# Patient Record
Sex: FEMALE | Race: ASIAN | Employment: OTHER | URBAN - METROPOLITAN AREA
[De-identification: names, ages, dates, MRNs, and addresses within clinical notes are randomized per-mention and may not be internally consistent; named-entity substitution may affect disease eponyms.]

---

## 2017-03-20 ENCOUNTER — HOSPITAL ENCOUNTER (OUTPATIENT)
Dept: MAMMOGRAPHY | Age: 67
Discharge: HOME | End: 2017-03-20
Attending: FAMILY MEDICINE
Payer: MEDICARE

## 2017-03-20 DIAGNOSIS — Z12.31 ENCOUNTER FOR SCREENING MAMMOGRAM FOR BREAST CANCER: ICD-10-CM

## 2017-03-20 DIAGNOSIS — Z01.419 WELL WOMAN EXAM: ICD-10-CM

## 2017-03-20 PROCEDURE — 77067 SCR MAMMO BI INCL CAD: CPT

## 2018-01-11 ENCOUNTER — HOSPITAL ENCOUNTER (OUTPATIENT)
Dept: GENERAL RADIOLOGY | Age: 68
Discharge: HOME | End: 2018-01-11
Attending: FAMILY MEDICINE
Payer: MEDICARE

## 2018-01-11 DIAGNOSIS — M54.41 RIGHT-SIDED LOW BACK PAIN WITH RIGHT-SIDED SCIATICA, UNSPECIFIED CHRONICITY: ICD-10-CM

## 2018-01-11 PROCEDURE — 72100 X-RAY EXAM L-S SPINE 2/3 VWS: CPT

## 2018-03-21 ENCOUNTER — HOSPITAL ENCOUNTER (OUTPATIENT)
Dept: MAMMOGRAPHY | Age: 68
Discharge: HOME | End: 2018-03-21
Attending: FAMILY MEDICINE
Payer: MEDICARE

## 2018-03-21 DIAGNOSIS — Z12.31 SCREENING MAMMOGRAM, ENCOUNTER FOR: ICD-10-CM

## 2018-03-21 PROCEDURE — 77067 SCR MAMMO BI INCL CAD: CPT

## 2018-11-19 ENCOUNTER — HOSPITAL ENCOUNTER (OUTPATIENT)
Dept: MAMMOGRAPHY | Age: 68
Discharge: HOME | End: 2018-11-19
Attending: FAMILY MEDICINE
Payer: MEDICARE

## 2018-11-19 DIAGNOSIS — M85.80 OSTEOPENIA, UNSPECIFIED LOCATION: ICD-10-CM

## 2018-11-19 DIAGNOSIS — M81.0 AGE-RELATED OSTEOPOROSIS WITHOUT CURRENT PATHOLOGICAL FRACTURE: ICD-10-CM

## 2018-11-19 PROCEDURE — 77080 DXA BONE DENSITY AXIAL: CPT

## 2019-04-08 ENCOUNTER — HOSPITAL ENCOUNTER (OUTPATIENT)
Dept: MAMMOGRAPHY | Age: 69
Discharge: HOME | End: 2019-04-08
Attending: FAMILY MEDICINE
Payer: MEDICARE

## 2019-04-08 DIAGNOSIS — Z12.31 VISIT FOR SCREENING MAMMOGRAM: ICD-10-CM

## 2019-04-08 PROCEDURE — 77067 SCR MAMMO BI INCL CAD: CPT

## 2019-06-21 ENCOUNTER — HOSPITAL ENCOUNTER (OUTPATIENT)
Dept: GENERAL RADIOLOGY | Age: 69
Discharge: HOME | End: 2019-06-21
Attending: FAMILY MEDICINE
Payer: MEDICARE

## 2019-06-21 DIAGNOSIS — M54.2 NECK PAIN: ICD-10-CM

## 2019-06-21 PROCEDURE — 72052 X-RAY EXAM NECK SPINE 6/>VWS: CPT

## 2019-06-24 ENCOUNTER — HOSPITAL ENCOUNTER (OUTPATIENT)
Dept: PHYSICAL THERAPY | Age: 69
Discharge: HOME | End: 2019-06-24
Attending: FAMILY MEDICINE
Payer: MEDICARE

## 2019-06-24 DIAGNOSIS — M54.2 NECK PAIN: ICD-10-CM

## 2019-06-24 PROCEDURE — 97162 PT EVAL MOD COMPLEX 30 MIN: CPT

## 2019-06-24 PROCEDURE — 97110 THERAPEUTIC EXERCISES: CPT

## 2019-06-24 PROCEDURE — 97140 MANUAL THERAPY 1/> REGIONS: CPT

## 2019-06-24 NOTE — THERAPY EVALUATION
Riley Clancy  : 1950  [place]  Secondary: [place] [id number] [place] [address], [facility] [address].  [phone]   [phone]           OUTPATIENT PHYSICAL THERAPY:Initial Assessment 2019   ICD-10: Treatment Diagnosis: Cervicalgia (M54.2)  Precautions/Allergies:   Penicillins   TREATMENT PLAN:  Effective Dates: 2019 TO 2019 (90 days). Frequency/Duration: 2 times a week for 90 Day(s) MEDICAL/REFERRING DIAGNOSIS:  Neck pain [M54.2]   DATE OF ONSET: 2019  REFERRING PHYSICIAN: Sonja Ramires MD MD Orders: Evaluate and Treat  Return MD Appointment: 19     INITIAL ASSESSMENT:  Ms. Marquis Interiano presents with decreased cervical ROM, bilateral shoulder strength L>R and increased pain leading to decreased functional status. Pt would benefit from skilled physical therapy services to address the above deficits and help patient return to prior level of function. PROBLEM LIST (Impacting functional limitations):  1. Decreased Strength  2. Decreased ADL/Functional Activities  3. Increased Pain  4. Decreased Flexibility/Joint Mobility  5. Decreased [place] with Home Exercise Program INTERVENTIONS PLANNED: (Treatment may consist of any combination of the following)  1. Cold  2. Cryotherapy  3. Electrical Stimulation  4. Heat  5. Home Exercise Program (HEP)  6. Manual Therapy  7. Range of Motion (ROM)  8. Therapeutic Exercise/Strengthening  9. Ultrasound (US)     GOALS: (Goals have been discussed and agreed upon with patient.)  Short-Term Functional Goals: Time Frame: 4 weeks  1. Pt will increase cervical ROM flexion = 40 degrees, side bending = 25 degrees bilaterally, rotation L = 60 degrees to assist with driving  2. Pt will increase strength L shoulder 4+/5 to assist with driving  3. Pt will be independent with HEP  Discharge Goals: Time Frame: 12 weeks  1.  Pt will increase strength bilateral shoulders 5/5 to assist with driving  2. Pt will improve Neck Disability Index Score to \"0\" to show overall improvement in function  3. Pt will drive 20 minutes with no c/o neck pain    OUTCOME MEASURE:   Tool Used: Neck Disability Index (NDI)  Score:  Initial: 5/50  Most Recent: X/50 (Date: -- )   Interpretation of Score: The Neck Disability Index is a revised form of the Oswestry Low Back Pain Index and is designed to measure the activities of daily living in person's with neck pain. Each section is scored on a 0-5 scale, 5 representing the greatest disability. The scores of each section are added together for a total score of 50. MEDICAL NECESSITY:   · Patient is expected to demonstrate progress in strength, range of motion and functional technique to increase independence with driving. · Patient demonstrates good rehab potential due to higher previous functional level. REASON FOR SERVICES/OTHER COMMENTS:  · Patient continues to require skilled intervention due to decreased cervical ROM, decreased bilateral shoulder strength and increased pain leading to decreased functional status. Total Duration:       Rehabilitation Potential For Stated Goals: Good  Regarding Shaila CLINT Lowe's therapy, I certify that the treatment plan above will be carried out by a therapist or under their direction. Thank you for this referral,  Agapito De La Garza PT     Referring Physician Signature: Natividad Hoover MD _______________________________ Date _____________     PAIN/SUBJECTIVE:   Initial:   3/10 Post Session:  2/10   HISTORY:   History of Injury/Illness (Reason for Referral):  Pt reports insidious onset L neck and UT pain of approximately 2 months duration. She states she had a shingles vaccine 2 months ago in her L shoulder and feels this may have contributed to her pain. She states she also started Fosamax about 2 months ago which may also have led to her symptoms.   She had x-rays which revealed degenerative disc disease at C4-C5, C5-C6 and C6-C7. Pt is taking Advil prn for her neck pain. She rates her current pain 3/10, increasing to 5/10 at times. She denies numbness/tingling into her L UE. She is retired. Pt is currently having difficulties turning her head to drive due to her neck pain. Past Medical History/Comorbidities:   Ms. Lian Scruggs  has a past medical history of Hypertension and Menopause. Ms. Lian Scruggs  has a past surgical history that includes hx colonoscopy (2016) and hx gyn. Social History/Living Environment:     Pt lives with  who assists with ADL's as needed  Prior Level of Function/Work/Activity:  Pt is retired  Dominant Side:         RIGHT  Other Clinical Tests:          X-rays cervical spine (see above)  Personal Factors:          Sex:  female        Age:  70 y.o. Profession:  Pt is retired   Ambulatory/Rehab [address] [place] [address] Risk Assessment   Risk Factors:       No Risk Factors Identified Ability to Rise from Chair:       (0)  Ability to rise in a single movement   Falls Prevention Plan:       No modifications necessary   Total: (5 or greater = High Risk): 0   ©2010 [facility] [address] [place] [address] States Patent #[id number]. Federal Law prohibits the replication, distribution or use without written permission from [facility] [employer]   Current Medications:       Current Outpatient Medications:     amLODIPine (NORVASC) 5 mg tablet, TAKE ONE TABLET BY MOUTH ONE TIME DAILY, Disp: 90 Tab, Rfl: 3    alendronate (FOSAMAX) 70 mg tablet, Take 1 Tab by mouth every seven (7) days. , Disp: 13 Tab, Rfl: 3    Blood Pressure Monitor kit, Check blood pressure twice daily, Disp: 1 Kit, Rfl: 0    GLUCOSAMINE-CONDROITIN-HRB#182 PO, Take  by mouth., Disp: , Rfl:     FISH  mg cap, Take  by mouth., Disp: , Rfl:     calcium-cholecalciferol, d3, (CALCIUM 600 + D) 600-125 mg-unit tab, Take  by mouth., Disp: , Rfl:     lutein 20 mg cap, Take  by mouth., Disp: , Rfl: Date Last Reviewed:  06-24-19   Number of Personal Factors/Comorbidities that affect the Plan of Care: 1-2: MODERATE COMPLEXITY   EXAMINATION:   Observation/Orthostatic Postural Assessment: Forward head, rounded shoulders  Palpation:          Tenderness L Upper Trap and Levator Scap  ROM:    Cervical Flexion = 31 degrees  Cervical Extension = 19 degrees  Cervical Side Bending R = 20 degrees  Cervical Side Bending L = 17 degrees  Cervical Rotation R = 61 degrees  Cervical Rotation L = 51 degrees     Strength:    R shoulder flexion =  4+/5  R shoulder abduction = 4+/5  R elbow flexion = 5/5  R elbow extension = 5/5  R wrist flexion = 5/5  R wrist extension = 5/5    L shoulder flexion = 4/5  L shoulder abduction = 4/5  L elbow flexion = 5/5  L elbow extension = 5/5  L wrist flexion = 5/5  L wrist extension = 5/5    Special Tests:          Cervical compression negative, cervical distraction reduces symptoms  Neurological Screen:        Myotomes:  Mild weakness Left C5        Dermatomes:  Sensation intact to light touch bilateral UE's        Reflexes:  DTR's 2+ to bilateral brachioradialis, biceps and triceps  Functional Mobility:         Transfers:  Pt able to transition sit to supine and supine to sit independently    Body Structures Involved:  1. Bones  2. Joints  3. Muscles Body Functions Affected:  1. Sensory/Pain  2. Neuromusculoskeletal Activities and Participation Affected:  1. Mobility  2.  Domestic Life   Number of elements (examined above) that affect the Plan of Care: 3: MODERATE COMPLEXITY   CLINICAL PRESENTATION:   Presentation: Evolving clinical presentation with changing clinical characteristics: MODERATE COMPLEXITY   CLINICAL DECISION MAKING:   Use of outcome tool(s) and clinical judgement create a POC that gives a: Questionable prediction of patient's progress: MODERATE COMPLEXITY

## 2019-06-24 NOTE — PROGRESS NOTES
Leyda Patel  : 1950  [place]  Secondary: [address]  [address], [address] [facility] 83[address] 0[address], 4[address] [facility]  [phone]   [phone]         OUTPATIENT PHYSICAL THERAPY: Daily Treatment Note 2019  Visit Count: 1    ICD-10: Treatment Diagnosis: Cervicalgia (M54.2)  Precautions/Allergies:   Penicillins   TREATMENT PLAN:  Effective Dates: 2019 TO 2019 (90 days). Frequency/Duration: 2 times a week for 90 Day(s)    Pre-treatment Symptoms/Complaints:  L neck pain  Pain: Initial:   3/10 Post Session:  2/10   Medications Last Reviewed:  2019  Updated Objective Findings:  See evaluation note from today  TREATMENT:     THERAPEUTIC EXERCISE: (10 minutes):  Exercises per grid below to improve mobility and strength. Required minimal verbal cues to promote proper body alignment and promote proper body posture. Progressed resistance and repetitions as indicated. MANUAL THERAPY: (15 minutes): Soft tissue mob to bilateral cervical paraspinals, UT and levator scap L>R was utilized and necessary because of the patient's restricted motion of soft tissue. Date:  19 Date:   Date:     Activity/Exercise Parameters Parameters Parameters   Chin Tucks in Supine 10 reps  5 sec holds     T-band Rows Red t-band  20 reps     L Upper Trap Stretch 3 reps  20 sec holds                                 MedBridge Portal  Treatment/Session Summary:    · Response to Treatment:  Pt tolerated all treatments well today with no c/o. She reported decreased pain following treatments today. .  · Communication/Consultation:  None today  · Equipment provided today:  None today  · Recommendations/Intent for next treatment session: Next visit will focus on manual therapy, progress therex as tolerable.     Total Treatment Billable Duration:  25 minutes  PT Patient Time In/Time Out  Time In: 0900  Time Out: 945  Shefali Gibson Marieta Dakins, PT    Future Appointments   Date Time Provider Nubia Whitmore   6/26/2019  2:30 PM Ivis Bass, PTA [facility] SFE   7/2/2019 10:15 AM Fabrice Luevanok, PT [facility] SFE   7/5/2019  3:15 PM Burkassie Muck, PT [facility] SFE   7/8/2019  3:15 PM Ivis Bass, PTA [facility] SFE   7/10/2019  3:15 PM [facility], Cely Danes, PTA [facility] SFE   7/12/2019 11:15 AM Elva Irvin MD SSA [facility] RF   7/16/2019  3:15 PM Ivis Bass, PTA [facility] SFE   7/18/2019  3:15 PM Fabrice Barron, PT [facility] SFE   12/5/2019 10:15 AM Augusto Jordan, Ivana Morales [facility]

## 2019-06-26 ENCOUNTER — HOSPITAL ENCOUNTER (OUTPATIENT)
Dept: PHYSICAL THERAPY | Age: 69
Discharge: HOME | End: 2019-06-26
Attending: FAMILY MEDICINE
Payer: MEDICARE

## 2019-06-26 PROCEDURE — 97110 THERAPEUTIC EXERCISES: CPT

## 2019-06-26 PROCEDURE — 97140 MANUAL THERAPY 1/> REGIONS: CPT

## 2019-06-26 NOTE — PROGRESS NOTES
Jose Matthew  : 1950  [place]  Secondary: [place] [id number] [facility], [address] [place] [address] [address], [facility] [address]  [phone]   [phone]         OUTPATIENT PHYSICAL THERAPY: Daily Treatment Note 2019  Visit Count: 1    ICD-10: Treatment Diagnosis: Cervicalgia (M54.2)  Precautions/Allergies:   Penicillins   TREATMENT PLAN:  Effective Dates: 2019 TO 2019 (90 days). Frequency/Duration: 2 times a week for 90 Day(s)    Pre-treatment Symptoms/Complaints:  L neck pain \"it is feeling a little better\". Pain: Initial:   3/10 Post Session:  2/10   Medications Last Reviewed:  2019  Updated Objective Findings:  None Today  TREATMENT:     THERAPEUTIC EXERCISE: (20 minutes):  Exercises per grid below to improve mobility and strength. Required minimal verbal cues to promote proper body alignment and promote proper body posture. Progressed resistance and repetitions as indicated. MANUAL THERAPY: (25 minutes): Soft tissue to Left Upper Trap and cervical region secondary to increased tightness and where the pain originates. was utilized and necessary because of the patient's restricted joint motion, loss of articular motion and restricted motion of soft tissue. Date:  19 Date:   Date:     Activity/Exercise Parameters Parameters Parameters   Chin Tucks in Supine 10 reps  5 sec holds     T-band Rows Red t-band  20 reps     L Upper Trap Stretch 3 reps  20 sec holds     UBE X 5 min Level 3.0     Left Upper Levator Stretch 3 x 20 sec holds                     MedBridge Portal  Treatment/Session Summary:    · Response to Treatment:  Pt tolerated all treatments well today with no c/o. She reported decreased pain following treatments today. .  · Communication/Consultation:  None today  · Equipment provided today:  None today  · Recommendations/Intent for next treatment session: Next visit will focus on manual therapy, progress therex as tolerable.     Total Treatment Billable Duration: 45 minutes  PT Patient Time In/Time Out  Time In: 1430  Time Out: Vijay Cheng, [place]    Future Appointments   Date Time Provider Nubia Myranda   6/26/2019  2:30 PM Yonatan Adames, PTA [facility] SFE   7/2/2019 10:15 AM Josh Woods, PT [facility] SFE   7/5/2019  3:15 PM Josh Woods, PT [facility] SFE   7/8/2019  3:15 PM Yonatan Adames, PTA [facility] SFE   7/10/2019  3:15 PM [facility], [facility], PTA [facility] SFE   7/12/2019 11:15 AM MD CRISTIANO Cervantes RFM RFM   7/16/2019  3:15 PM Yonatan Adames, PTA [facility] SFE   7/18/2019  3:15 PM Josh Woods, PT [facility] SFE   12/5/2019 10:15 AM Laura Alfaro MD SSA [facility] RF

## 2019-07-02 ENCOUNTER — HOSPITAL ENCOUNTER (OUTPATIENT)
Dept: PHYSICAL THERAPY | Age: 69
Discharge: HOME | End: 2019-07-02
Attending: FAMILY MEDICINE
Payer: MEDICARE

## 2019-07-02 PROCEDURE — 97110 THERAPEUTIC EXERCISES: CPT

## 2019-07-02 PROCEDURE — 97140 MANUAL THERAPY 1/> REGIONS: CPT

## 2019-07-02 NOTE — PROGRESS NOTES
Payton Saavedra  : 1950  [place]  Secondary: [address] [facility]  [address] [facility] [address], [address], [place] [address]  [phone]   [phone]         OUTPATIENT PHYSICAL THERAPY: Daily Treatment Note 2019  Visit Count: 1    ICD-10: Treatment Diagnosis: Cervicalgia (M54.2)  Precautions/Allergies:   Penicillins   TREATMENT PLAN:  Effective Dates: 2019 TO 2019 (90 days). Frequency/Duration: 2 times a week for 90 Day(s)    Pre-treatment Symptoms/Complaints:  L neck pain; Pt states her neck is much better. Pain: Initial:   2/10 Post Session:  0/10   Medications Last Reviewed:  2019  Updated Objective Findings:  None Today  TREATMENT:     THERAPEUTIC EXERCISE: (20 minutes):  Exercises per grid below to improve mobility and strength. Required minimal verbal cues to promote proper body alignment and promote proper body posture. Progressed resistance and repetitions as indicated. MANUAL THERAPY: (25 minutes): Soft tissue to Left Upper Trap and cervical region secondary to increased tightness and where the pain originates. was utilized and necessary because of the patient's restricted joint motion, loss of articular motion and restricted motion of soft tissue. Date:  19 Date:  19 Date:     Activity/Exercise Parameters Parameters Parameters   Chin Tucks in Supine 10 reps  5 sec holds 10 reps  5 sec holds    T-band Rows Red t-band  20 reps Green T-band  20 reps    L Upper Trap Stretch 3 reps  20 sec holds 3 reps  20 sec holds    UBE X 5 min Level 3.0 Level 3   6 minutes    Left Upper Levator Stretch 3 x 20 sec holds     T-band Straight Arm Pulldowns  Green T-band  20 reps              MedBridge Portal  Treatment/Session Summary:    · Response to Treatment:  Pt tolerated all treatments well today with no c/o. She denied pain following treatments today. .  · Communication/Consultation:  None today  · Equipment provided today:  None today  · Recommendations/Intent for next treatment session: Next visit will focus on manual therapy, progress therex as tolerable.     Total Treatment Billable Duration: 45 minutes  PT Patient Time In/Time Out  Time In: 1015  Time Out: 200 [facility], PT    Future Appointments   Date Time Provider Nubia Whitmore   7/5/2019  3:15 PM Delphine Ponce, PT [facility]   7/8/2019  3:15 PM Shayy Richard PTA [facility] E   7/10/2019  3:15 PM Shayy Richard PTA [facility] E   7/12/2019 11:15 AM MD CRISTIANO Lovelace RFM RFM   7/16/2019  3:15 PM Shayy Richard PTA [facility] E   7/18/2019  3:15 [facility]   12/5/2019 10:15 AM Vanessa Pappas MD SSA RFM RF

## 2019-07-05 ENCOUNTER — HOSPITAL ENCOUNTER (OUTPATIENT)
Dept: PHYSICAL THERAPY | Age: 69
Discharge: HOME | End: 2019-07-05
Attending: FAMILY MEDICINE
Payer: MEDICARE

## 2019-07-05 PROCEDURE — 97110 THERAPEUTIC EXERCISES: CPT

## 2019-07-05 PROCEDURE — 97140 MANUAL THERAPY 1/> REGIONS: CPT

## 2019-07-05 NOTE — PROGRESS NOTES
Sherine Winston  : 1950  [place]  Secondary: [address]  27[address] [facility], [address] [place] [address] [address], 9[address] [facility]  [phone]   [phone]         OUTPATIENT PHYSICAL THERAPY: Daily Treatment Note 2019  Visit Count: 1    ICD-10: Treatment Diagnosis: Cervicalgia (M54.2)  Precautions/Allergies:   Penicillins   TREATMENT PLAN:  Effective Dates: 2019 TO 2019 (90 days). Frequency/Duration: 2 times a week for 90 Day(s)    Pre-treatment Symptoms/Complaints:  L neck pain; Pt states she has a little discomfort in her L UT area, but she states her scapular pain is gone now. Pain: Initial:   2/10 Post Session:  0/10   Medications Last Reviewed:  2019  Updated Objective Findings:  None Today  TREATMENT:     THERAPEUTIC EXERCISE: (20 minutes):  Exercises per grid below to improve mobility and strength. Required minimal verbal cues to promote proper body alignment and promote proper body posture. Progressed resistance and repetitions as indicated. MANUAL THERAPY: (25 minutes): Soft tissue to Left Upper Trap and cervical region secondary to increased tightness and where the pain originates. was utilized and necessary because of the patient's restricted joint motion, loss of articular motion and restricted motion of soft tissue. Date:  19   Activity/Exercise Parameters   Chin Tucks in Supine 10 reps  5 sec holds   T-band Rows Green T-band  20 reps   L Upper Trap Stretch 3 reps  20 sec holds   UBE Level 3   6 minutes   Left Upper Levator Stretch    T-band Straight Arm Pulldowns Green T-band  20 reps           MedBridge Portal  Treatment/Session Summary:    · Response to Treatment:  Pt tolerated all treatments well today with no c/o. She continues to improve with decreasing pain.   · Communication/Consultation:  None today  · Equipment provided today:  None today  · Recommendations/Intent for next treatment session: Next visit will focus on manual therapy, progress therex as tolerable.     Total Treatment Billable Duration: 45 minutes  PT Patient Time In/Time Out  Time In: 9632  Time Out: 1600  Collin Rowe, PT    Future Appointments   Date Time Provider Nubia Myranda   7/8/2019  3:15 PM Lefty Choi, PT [facility]   7/10/2019  3:15 PM Jill Lee PTA [facility]   7/12/2019 11:15 AM MD CRISTIANO Simental [facility]   7/16/2019  3:15 PM Jill Lee, [facility]   7/18/2019  3:15 PM Lefty Choi, PT [facility]   12/5/2019 10:15 AM Pablito Smith MD SSA [facility]

## 2019-07-08 ENCOUNTER — HOSPITAL ENCOUNTER (OUTPATIENT)
Dept: PHYSICAL THERAPY | Age: 69
Discharge: HOME | End: 2019-07-08
Attending: FAMILY MEDICINE
Payer: MEDICARE

## 2019-07-08 PROCEDURE — 97140 MANUAL THERAPY 1/> REGIONS: CPT

## 2019-07-08 PROCEDURE — 97110 THERAPEUTIC EXERCISES: CPT

## 2019-07-08 NOTE — PROGRESS NOTES
Diane Rice  : 1950  [place]  Secondary: [place] [id number] [facility], [address] [place] [address] 83,[address], 3261 [facility]  [phone]   [phone]         OUTPATIENT PHYSICAL THERAPY: Daily Treatment Note 2019  Visit Count: 1    ICD-10: Treatment Diagnosis: Cervicalgia (M54.2)  Precautions/Allergies:   Penicillins   TREATMENT PLAN:  Effective Dates: 2019 TO 2019 (90 days). Frequency/Duration: 2 times a week for 90 Day(s)    Pre-treatment Symptoms/Complaints:  L neck pain; Pt states she was sore all over yesterday after getting a shingles vaccine. Pain: Initial:   2/10 Post Session:  0/10   Medications Last Reviewed:  2019  Updated Objective Findings:  None Today  TREATMENT:     THERAPEUTIC EXERCISE: (20 minutes):  Exercises per grid below to improve mobility and strength. Required minimal verbal cues to promote proper body alignment and promote proper body posture. Progressed resistance and repetitions as indicated. MANUAL THERAPY: (25 minutes): Soft tissue to Left Upper Trap and cervical region secondary to increased tightness and where the pain originates. was utilized and necessary because of the patient's restricted joint motion, loss of articular motion and restricted motion of soft tissue. Date:  19 Date:  19   Activity/Exercise Parameters    Chin Tucks in Supine 10 reps  5 sec holds 10 reps  5 sec holds   T-band Rows Green T-band  20 reps Green t-band  20 reps   L Upper Trap Stretch 3 reps  20 sec holds 3 reps  20 sec holds   UBE Level 3   6 minutes Level 3  6 minutes   Left Upper Levator Stretch     T-band Straight Arm Pulldowns Green T-band  20 reps Green T-band  20 reps            MedBridge Portal  Treatment/Session Summary:    · Response to Treatment:  Pt tolerated all treatments well today with no c/o. Pain improved with treatments today.   · Communication/Consultation: None today  · Equipment provided today:  None today  · Recommendations/Intent for next treatment session: Next visit will focus on manual therapy, progress therex as tolerable.     Total Treatment Billable Duration: 45 minutes  PT Patient Time In/Time Out  Time In: 3211  Time Out: K[place] [address], PT    Future Appointments   Date Time Provider Nubia Myranda   7/10/2019  3:15 PM Camelia Phipps PTA [facility]   7/12/2019 11:15 AM MD CRISTIANO Fish RF RF   7/16/2019  3:15 PM Camelia Phipps PTA [facility]   7/18/2019  3:15 PM Karlos Larios, MARIA TERESA [facility]   12/5/2019 10:15 AM Yusuf Olivera MD SSA [facility]

## 2019-07-10 ENCOUNTER — HOSPITAL ENCOUNTER (OUTPATIENT)
Dept: PHYSICAL THERAPY | Age: 69
Discharge: HOME | End: 2019-07-10
Attending: FAMILY MEDICINE
Payer: MEDICARE

## 2019-07-10 PROCEDURE — 97110 THERAPEUTIC EXERCISES: CPT

## 2019-07-10 PROCEDURE — 97140 MANUAL THERAPY 1/> REGIONS: CPT

## 2019-07-10 NOTE — PROGRESS NOTES
Carol Vicky  : 1950  [place]  [place] [address] [facility] [place] [id number] [facility], [address] [place] [address],[address], [facility] [address].  [phone]   [phone]         OUTPATIENT PHYSICAL THERAPY: Daily Treatment Note 7/10/2019  Visit Count: 1    ICD-10: Treatment Diagnosis: Cervicalgia (M54.2)  Precautions/Allergies:   Penicillins   TREATMENT PLAN:  Effective Dates: 2019 TO 2019 (90 days). Frequency/Duration: 2 times a week for 90 Day(s)    Pre-treatment Symptoms/Complaints:  L neck pain; Pt states she was sore all over yesterday after getting a shingles vaccine. Pain: Initial:   210 Post Session:  0/10   Medications Last Reviewed:  7/10/2019  Updated Objective Findings:  None Today  TREATMENT:     THERAPEUTIC EXERCISE: (20 minutes):  Exercises per grid below to improve mobility and strength. Required minimal verbal cues to promote proper body alignment and promote proper body posture. Progressed resistance and repetitions as indicated. MANUAL THERAPY: (25 minutes): Soft tissue to Left Upper Trap and cervical region secondary to increased tightness and where the pain originates. was utilized and necessary because of the patient's restricted joint motion, loss of articular motion and restricted motion of soft tissue. Date:  19 Date:  07-10-19   Activity/Exercise Parameters    Chin Tucks in Supine 10 reps  5 sec holds 10 reps  5 sec holds   T-band Rows Green T-band  20 reps Green t-band  20 reps   L Upper Trap Stretch 3 reps  20 sec holds 3 reps  20 sec holds   UBE Level 3   6 minutes Level 3  6 minutes   Left Upper Levator Stretch     T-band Straight Arm Pulldowns Green T-band  20 reps Green T-band  20 reps            MedBridge Portal  Treatment/Session Summary:    · Response to Treatment:  Pt tolerated all treatments well today with no c/o. Pain improved after manual today.   Patient convinced the shingle shot made her symptoms worse. · Communication/Consultation:  None today  · Equipment provided today:  None today  · Recommendations/Intent for next treatment session: Next visit will focus on manual therapy, progress therex as tolerable.     Total Treatment Billable Duration: 45 minutes  PT Patient Time In/Time Out  Time In: 3562  Time Out: [address] , [facility]    Future Appointments   Date Time Provider Nubia Whitmore   7/12/2019 11:15 AM MD CRISTIANO Mcfadden RFM RF   7/16/2019  3:15 PM Linden Wyatt[facility]   7/18/2019  3:15 PM Zenon Michael[facility]   12/5/2019 10:15 AM Latricia Lima MD SSA [facility] RF

## 2019-07-15 ENCOUNTER — APPOINTMENT (OUTPATIENT)
Dept: PHYSICAL THERAPY | Age: 69
End: 2019-07-15
Attending: FAMILY MEDICINE
Payer: MEDICARE

## 2019-07-16 ENCOUNTER — HOSPITAL ENCOUNTER (OUTPATIENT)
Dept: PHYSICAL THERAPY | Age: 69
Discharge: HOME | End: 2019-07-16
Attending: FAMILY MEDICINE
Payer: MEDICARE

## 2019-07-16 PROCEDURE — 97140 MANUAL THERAPY 1/> REGIONS: CPT

## 2019-07-16 PROCEDURE — 97110 THERAPEUTIC EXERCISES: CPT

## 2019-07-16 NOTE — PROGRESS NOTES
Nancy Brunilda  : 1950  [place]  Secondary: [place] [address] [facility], [address] [place] [address],[address], [facility] [address]  [phone]   [phone]         OUTPATIENT PHYSICAL THERAPY: Daily Treatment Note 2019  Visit Count: 1    ICD-10: Treatment Diagnosis: Cervicalgia (M54.2)  Precautions/Allergies:   Penicillins   TREATMENT PLAN:  Effective Dates: 2019 TO 2019 (90 days). Frequency/Duration: 2 times a week for 90 Day(s)    Pre-treatment Symptoms/Complaints:  L neck pain; \"feels better since I started therapy\". Pain: Initial:   210 Post Session:  010   Medications Last Reviewed:  2019  Updated Objective Findings:  None Today  TREATMENT:     THERAPEUTIC EXERCISE: (20 minutes):  Exercises per grid below to improve mobility and strength. Required minimal verbal cues to promote proper body alignment and promote proper body posture. Progressed resistance and repetitions as indicated. MANUAL THERAPY: (25 minutes): Soft tissue to Left Upper Trap and cervical region secondary to increased tightness and where the pain originates. was utilized and necessary because of the patient's restricted joint motion, loss of articular motion and restricted motion of soft tissue. Date:  19 Date:  19   Activity/Exercise Parameters    Chin Tucks in Supine 10 reps  5 sec holds 10 reps  5 sec holds   T-band Rows Green T-band  20 reps Green t-band  20 reps   L Upper Trap Stretch 3 reps  20 sec holds 3 reps  20 sec holds   UBE Level 3   6 minutes Level 3  6 minutes   Left Upper Levator Stretch     T-band Straight Arm Pulldowns Green T-band  20 reps Green T-band  20 reps            MedBridge Portal  Treatment/Session Summary:    · Response to Treatment:  Pt tolerated all treatments well today with no c/o. Pain improved after manual today.   Good progress with therapy at this point.  · Communication/Consultation:  None today  · Equipment provided today:  None today  · Recommendations/Intent for next treatment session: Next visit will focus on manual therapy, progress therex as tolerable.     Total Treatment Billable Duration: 45 minutes  PT Patient Time In/Time Out  Time In: 4496  Time Out: [address] [place], [place]    Future Appointments   Date Time Provider Nubia Whitmore   7/16/2019  3:15 PM Franklyn Toscano PTA [facility]   7/18/2019  3:15 [facility]   12/5/2019 10:15 AM Bird Kaur MD [facility]

## 2019-07-17 ENCOUNTER — APPOINTMENT (OUTPATIENT)
Dept: PHYSICAL THERAPY | Age: 69
End: 2019-07-17
Attending: FAMILY MEDICINE
Payer: MEDICARE

## 2019-07-18 ENCOUNTER — HOSPITAL ENCOUNTER (OUTPATIENT)
Dept: PHYSICAL THERAPY | Age: 69
Discharge: HOME | End: 2019-07-18
Attending: FAMILY MEDICINE
Payer: MEDICARE

## 2019-07-18 PROCEDURE — 97110 THERAPEUTIC EXERCISES: CPT

## 2019-07-18 PROCEDURE — 97140 MANUAL THERAPY 1/> REGIONS: CPT

## 2019-07-18 NOTE — PROGRESS NOTES
Tiffany Torre  : 1950  [place]  [place] [address] [facility] [place] [address] [facility], [address] [place] [address],[address], [address] [facility] [place]  [phone]   [phone]         OUTPATIENT PHYSICAL THERAPY: Daily Treatment Note 2019  Visit Count: 1    ICD-10: Treatment Diagnosis: Cervicalgia (M54.2)  Precautions/Allergies:   Penicillins   TREATMENT PLAN:  Effective Dates: 2019 TO 2019 (90 days). Frequency/Duration: 2 times a week for 90 Day(s)    Pre-treatment Symptoms/Complaints:  L neck pain; Pt states her neck is feeling better. Pain: Initial:   10 Post Session:  010   Medications Last Reviewed:  2019  Updated Objective Findings:  None Today  TREATMENT:     THERAPEUTIC EXERCISE: (20 minutes):  Exercises per grid below to improve mobility and strength. Required minimal verbal cues to promote proper body alignment and promote proper body posture. Progressed resistance and repetitions as indicated. MANUAL THERAPY: (25 minutes): Soft tissue to Left Upper Trap and cervical region secondary to increased tightness and where the pain originates. was utilized and necessary because of the patient's restricted joint motion, loss of articular motion and restricted motion of soft tissue.       Date:  19 Date:  19 Date:  19   Activity/Exercise Parameters     Chin Tucks in Supine 10 reps  5 sec holds 10 reps  5 sec holds 10 reps  5 sec holds   T-band Rows Green T-band  20 reps Green t-band  20 reps Green T-band  20 reps   L Upper Trap Stretch 3 reps  20 sec holds 3 reps  20 sec holds 3 reps  20 sec holds   UBE Level 3   6 minutes Level 3  6 minutes Level 3  6 minutes   Left Upper Levator Stretch      T-band Straight Arm Pulldowns Green T-band  20 reps Green T-band  20 reps Green T-band  20 reps             MedBridge Portal  Treatment/Session Summary:    · Response to Treatment:  Pt tolerated all treatments well today with no c/o. Pt having much improved pain. · Communication/Consultation:  None today  · Equipment provided today:  None today  · Recommendations/Intent for next treatment session: Next visit will focus on manual therapy, progress therex as tolerable.     Total Treatment Billable Duration: 45 minutes  PT Patient Time In/Time Out  Time In: 0511  Time Out: 1600  Caretha [place], MARIA TERESA    Future Appointments   Date Time Provider Nubia Whitmore   12/5/2019 10:15 AM Kristin Wagner MD [facility]

## 2019-09-16 NOTE — THERAPY DISCHARGE
Tyson Angela  : 1950  [place]  Secondary: [address]  [address] [facility], [address] [place] [address] [address], [address] [facility]  [phone]   [phone]         OUTPATIENT PHYSICAL THERAPY: Discharges Treatment Note 2019  Visit Count: 8    ICD-10: Treatment Diagnosis: Cervicalgia (M54.2)  Precautions/Allergies:   Penicillins   TREATMENT PLAN:  Effective Dates: 2019 TO 2019 (90 days). Frequency/Duration: 2 times a week for 90 Day(s)    Pre-treatment Symptoms/Complaints:  L neck pain; Pt states her neck is feeling better. Pain: Initial:   2/10 Post Session:  0/10   Medications Last Reviewed:  2019  Updated Objective Findings:  None Today  TREATMENT:   MedBridge Portal  Treatment/Session Summary:    · Response to Treatment:  Patient was seen for 8 visits. Patient did not return after her -19. Discharge at this time. · Communication/Consultation:  None today  · Equipment provided today:  None today  · Recommendations/Intent for next treatment session: Next visit will focus on manual therapy, progress therex as tolerable.     Total Treatment Billable Duration:   PT Patient Time In/Time Out  Time In: 1515  Time Out: [address], [facility]    Future Appointments   Date Time Provider Nubia Whitmore   12/10/2019 10:30 AM Francia Downing MD [facility]

## 2020-08-11 ENCOUNTER — HOSPITAL ENCOUNTER (OUTPATIENT)
Dept: MAMMOGRAPHY | Age: 70
Discharge: HOME | End: 2020-08-11
Attending: FAMILY MEDICINE
Payer: MEDICARE

## 2020-08-11 DIAGNOSIS — Z12.31 VISIT FOR SCREENING MAMMOGRAM: ICD-10-CM

## 2020-08-11 PROCEDURE — 77067 SCR MAMMO BI INCL CAD: CPT

## 2020-11-20 ENCOUNTER — HOSPITAL ENCOUNTER (OUTPATIENT)
Dept: MAMMOGRAPHY | Age: 70
Discharge: HOME | End: 2020-11-20
Attending: FAMILY MEDICINE
Payer: MEDICARE

## 2020-11-20 DIAGNOSIS — M85.80 OSTEOPENIA, UNSPECIFIED LOCATION: ICD-10-CM

## 2020-11-20 DIAGNOSIS — Z78.0 MENOPAUSE: ICD-10-CM

## 2020-11-20 PROCEDURE — 77080 DXA BONE DENSITY AXIAL: CPT

## 2021-02-10 ENCOUNTER — TRANSCRIBE ORDER (OUTPATIENT)
Dept: SCHEDULING | Age: 71
End: 2021-02-10

## 2021-02-10 DIAGNOSIS — Z12.31 SCREENING MAMMOGRAM FOR HIGH-RISK PATIENT: Primary | ICD-10-CM

## 2021-08-13 ENCOUNTER — HOSPITAL ENCOUNTER (OUTPATIENT)
Dept: MAMMOGRAPHY | Age: 71
Discharge: HOME | End: 2021-08-13
Attending: FAMILY MEDICINE
Payer: MEDICARE

## 2021-08-13 DIAGNOSIS — Z12.31 SCREENING MAMMOGRAM FOR HIGH-RISK PATIENT: ICD-10-CM

## 2021-08-13 PROCEDURE — 77067 SCR MAMMO BI INCL CAD: CPT

## 2021-09-24 ENCOUNTER — TRANSCRIBE ORDER (OUTPATIENT)
Dept: REGISTRATION | Age: 71
End: 2021-09-24

## 2021-09-24 DIAGNOSIS — Z12.31 SCREENING MAMMOGRAM FOR HIGH-RISK PATIENT: Primary | ICD-10-CM

## 2022-08-15 ENCOUNTER — HOSPITAL ENCOUNTER (OUTPATIENT)
Dept: MAMMOGRAPHY | Age: 72
Discharge: HOME | End: 2022-08-18
Payer: MEDICARE

## 2022-08-15 DIAGNOSIS — Z12.31 ENCOUNTER FOR SCREENING MAMMOGRAM FOR MALIGNANT NEOPLASM OF BREAST: ICD-10-CM

## 2022-08-15 PROCEDURE — 77067 SCR MAMMO BI INCL CAD: CPT

## 2023-02-24 ENCOUNTER — TRANSCRIBE ORDERS (OUTPATIENT)
Dept: SCHEDULING | Age: 73
End: 2023-02-24

## 2023-02-24 DIAGNOSIS — Z12.31 SCREENING MAMMOGRAM FOR HIGH-RISK PATIENT: Primary | ICD-10-CM

## 2023-08-14 ENCOUNTER — TRANSCRIBE ORDERS (OUTPATIENT)
Dept: SCHEDULING | Age: 73
End: 2023-08-14

## 2023-08-14 DIAGNOSIS — Z12.31 VISIT FOR SCREENING MAMMOGRAM: Primary | ICD-10-CM

## 2023-08-16 ENCOUNTER — HOSPITAL ENCOUNTER (OUTPATIENT)
Dept: MAMMOGRAPHY | Age: 73
Discharge: HOME | End: 2023-08-19
Payer: MEDICARE

## 2023-08-16 VITALS — WEIGHT: 105 LBS | BODY MASS INDEX: 22.65 KG/M2 | HEIGHT: 57 IN

## 2023-08-16 DIAGNOSIS — Z12.31 VISIT FOR SCREENING MAMMOGRAM: ICD-10-CM

## 2023-08-16 PROCEDURE — 77067 SCR MAMMO BI INCL CAD: CPT

## 2023-12-27 ENCOUNTER — TRANSCRIBE ORDERS (OUTPATIENT)
Dept: SCHEDULING | Age: 73
End: 2023-12-27

## 2023-12-27 DIAGNOSIS — Z12.31 VISIT FOR SCREENING MAMMOGRAM: Primary | ICD-10-CM

## 2024-08-17 ENCOUNTER — HOSPITAL ENCOUNTER (OUTPATIENT)
Dept: MAMMOGRAPHY | Age: 74
End: 2024-08-17
Payer: MEDICARE

## 2024-08-17 VITALS — WEIGHT: 103 LBS | HEIGHT: 57 IN | BODY MASS INDEX: 22.22 KG/M2

## 2024-08-17 DIAGNOSIS — Z12.31 VISIT FOR SCREENING MAMMOGRAM: ICD-10-CM

## 2024-08-17 PROCEDURE — 77067 SCR MAMMO BI INCL CAD: CPT

## 2024-11-27 ENCOUNTER — HOSPITAL ENCOUNTER (OUTPATIENT)
Dept: MAMMOGRAPHY | Age: 74
Discharge: HOME | End: 2024-11-30
Payer: MEDICARE

## 2024-11-27 DIAGNOSIS — Z78.0 ASYMPTOMATIC MENOPAUSAL STATE: ICD-10-CM

## 2024-11-27 DIAGNOSIS — M81.0 AGE-RELATED OSTEOPOROSIS WITHOUT CURRENT PATHOLOGICAL FRACTURE: ICD-10-CM

## 2024-11-27 PROCEDURE — 77080 DXA BONE DENSITY AXIAL: CPT

## 2025-03-03 ENCOUNTER — TRANSCRIBE ORDERS (OUTPATIENT)
Dept: SCHEDULING | Age: 75
End: 2025-03-03

## 2025-03-03 DIAGNOSIS — Z12.31 VISIT FOR SCREENING MAMMOGRAM: Primary | ICD-10-CM

## 2025-08-25 ENCOUNTER — HOSPITAL ENCOUNTER (OUTPATIENT)
Dept: MAMMOGRAPHY | Age: 75
Discharge: HOME | End: 2025-08-28
Payer: MEDICARE

## 2025-08-25 VITALS — WEIGHT: 103 LBS | HEIGHT: 56 IN | BODY MASS INDEX: 23.17 KG/M2

## 2025-08-25 DIAGNOSIS — Z12.31 VISIT FOR SCREENING MAMMOGRAM: ICD-10-CM

## 2025-08-25 PROCEDURE — 77063 BREAST TOMOSYNTHESIS BI: CPT
